# Patient Record
Sex: MALE | Race: WHITE
[De-identification: names, ages, dates, MRNs, and addresses within clinical notes are randomized per-mention and may not be internally consistent; named-entity substitution may affect disease eponyms.]

---

## 2017-10-11 ENCOUNTER — HOSPITAL ENCOUNTER (EMERGENCY)
Dept: HOSPITAL 62 - ER | Age: 22
Discharge: HOME | End: 2017-10-11
Payer: MEDICAID

## 2017-10-11 VITALS — DIASTOLIC BLOOD PRESSURE: 71 MMHG | SYSTOLIC BLOOD PRESSURE: 133 MMHG
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
## 2017-10-11 DIAGNOSIS — M25.531: ICD-10-CM

## 2017-10-11 DIAGNOSIS — S63.501A: Primary | ICD-10-CM

## 2017-10-11 DIAGNOSIS — F17.210: ICD-10-CM

## 2017-10-11 PROCEDURE — L3908 WHO COCK-UP NONMOLDE PRE OTS: HCPCS

## 2017-10-11 PROCEDURE — 73110 X-RAY EXAM OF WRIST: CPT

## 2017-10-11 PROCEDURE — 99283 EMERGENCY DEPT VISIT LOW MDM: CPT

## 2017-10-11 NOTE — RADIOLOGY REPORT (SQ)
EXAM DESCRIPTION:  WRIST RIGHT 3 VIEWS



COMPLETED DATE/TIME:  10/11/2017 9:15 am



REASON FOR STUDY:  pain, swelling at ulnar styloid



COMPARISON:  None.



NUMBER OF VIEWS:  Three views.



TECHNIQUE:  AP, lateral, and oblique radiographic images acquired of the right wrist.



LIMITATIONS:  None.



FINDINGS:  MINERALIZATION: Normal.

BONES: No acute fracture or dislocation.  No worrisome bone lesions.  Normal alignment.

SOFT TISSUES: There is mild right wrist soft tissue swelling.  No radiopaque foreign body.  No soft t
issue gas.

OTHER: No other significant finding.



IMPRESSION:  Soft tissue swelling without fracture.



TECHNICAL DOCUMENTATION:  JOB ID:  [id number]

 2011 [employer]- All Rights Reserved

## 2017-10-11 NOTE — ER DOCUMENT REPORT
ED Hand/Wrist Injury





- General


Mode of Arrival: Ambulatory


Information source: Patient


TRAVEL OUTSIDE OF THE [place] IN LAST 30 DAYS: No





<JONA KWON - Last Filed: 10/11/17 11:14>





<PATRICIA TAYLOR - Last Filed: 10/11/17 11:38>





- General


Chief Complaint: Wrist Injury


Stated Complaint: WRIST PAIN


Time Seen by Provider: 10/11/17 08:11


Notes: 





Patient is a 22-year-old male who presents to the emergency department today 

with complaints of right wrist pain.  Patient states that approximately 1 week 

ago he was punching a punching bag video game and he "heard pop".  Patient 

states later he was playing air hockey and he felt like his wrist was "popping 

in and out".  Patient states he has minimal pain now but he "wants to make sure 

his wrist is okay so that he can play softball". (JONA KWON)





Past Medical History





- General


Information source: Patient





- Social History


Smoking Status: Current Every Day Smoker


Cigarette use (# per day): Yes


Chew tobacco use (# tins/day): No


Frequency of alcohol use: None


Drug Abuse: None


Lives with: Family


Family History: Reviewed & Not Pertinent


Neurological Medical History: Reports: Hx Migraine


Surgical Hx: Negative





- Immunizations


Immunizations up to date: Yes


Hx Diphtheria, Pertussis, Tetanus Vaccination: Yes





<JONA KWON - Last Filed: 10/11/17 11:14>





Review of Systems





- Review of Systems


Constitutional: No symptoms reported


EENT: No symptoms reported


Cardiovascular: No symptoms reported


Respiratory: No symptoms reported


Gastrointestinal: No symptoms reported


Genitourinary: No symptoms reported


Male Genitourinary: No symptoms reported


Musculoskeletal: See HPI, Joint pain - right wrist


Skin: No symptoms reported


Hematologic/Lymphatic: No symptoms reported


Neurological/Psychological: No symptoms reported


-: Yes All other systems reviewed and negative





<JONA KWON - Last Filed: 10/11/17 11:14>





Physical Exam





<JONA KWON - Last Filed: 10/11/17 11:14>





<PATRICIA TAYLOR - Last Filed: 10/11/17 11:38>





- Vital signs


Vitals: 


 











Temp Pulse Resp BP Pulse Ox


 


 97.8 F   58 L  16   133/71 H  100 


 


 10/11/17 07:56  10/11/17 07:56  10/11/17 07:56  10/11/17 07:56  10/11/17 07:56














- Notes


Notes: 





Physical Exam:


 


General: Alert, appears well. 


 


HEENT: Normocephalic. Atraumatic. PERRLA. Extraocular movements intact. 

Oropharynx clear.


 


Neck: Supple. 


 


Respiratory: No respiratory distress. 


 


Abdominal: Normal Inspection. No distension. 


 


Extremities: Edema over the right ulnar styloid, minimal pain with palpation. 

No pain with ROM. 


 


Neurological: Normal cognition. AAOx4. Normal speech.  


 


Psychological: Normal affect. Normal Mood. 


 


Skin: Warm. Dry. Normal color. (JONA KWON)





Course





<JONA KWON - Last Filed: 10/11/17 11:14>





- Diagnostic Test


Radiology reviewed: Image reviewed, Reports reviewed - X-ray shows soft tissue 

swelling around the ulnar styloid without fracture





<PATRICIA TAYLOR - Last Filed: 10/11/17 11:38>





- Re-evaluation


Re-evalutation: 





10/11/17 11:37


The Velcro cock-up splint was placed on the right wrist by the PCT.  It fits 

well and provides good support and stability and protection of the wrist where 

it is sprained at the ulnar styloid. (PATRICIA TAYLOR)





- Vital Signs


Vital signs: 


 











Temp Pulse Resp BP Pulse Ox


 


 97.8 F   58 L  16   133/71 H  100 


 


 10/11/17 07:56  10/11/17 07:56  10/11/17 07:56  10/11/17 07:56  10/11/17 07:56














Discharge





<JONA KWON - Last Filed: 10/11/17 11:14>





<PATRICIA TAYLOR - Last Filed: 10/11/17 11:38>





- Discharge


Clinical Impression: 


Right wrist sprain


Qualifiers:


 Encounter type: initial encounter Qualified Code(s): S63.501A - Unspecified 

sprain of right wrist, initial encounter





Condition: Stable


Disposition: HOME, SELF-CARE


Additional Instructions: 


Your x-ray does not show a fracture.  There is soft tissue swelling around the 

ulnar styloid consistent with a sprain to this area.


The treatment of a sprain this many days out, is splinting the wrist to limit 

motion, elevation, and moist heat.


You should follow-up with an orthopedic doctor to ensure proper healing of this 

injury.


Referrals: 


[facility] [Provider Group] - Follow up in 1 week


Scribe Attestation: 





10/11/17 09:38


I personally performed the services described in the documentation, reviewed 

and edited the documentation which was dictated to the scribe in my presence, 

and it accurately records my words and actions. (PATRICIA TAYLOR)





Scribe Documentation





- Scribe


Written by Rickey:: Rickey العلي, 10/11/2017 1116


acting as scribe for :: Jw





<JONA KWON - Last Filed: 10/11/17 11:14>

## 2018-10-22 ENCOUNTER — HOSPITAL ENCOUNTER (EMERGENCY)
Dept: HOSPITAL 62 - ER | Age: 23
Discharge: HOME | End: 2018-10-22
Payer: SELF-PAY

## 2018-10-22 VITALS — DIASTOLIC BLOOD PRESSURE: 81 MMHG | SYSTOLIC BLOOD PRESSURE: 135 MMHG

## 2018-10-22 DIAGNOSIS — S89.92XA: Primary | ICD-10-CM
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
## 2018-10-22 DIAGNOSIS — F17.210: ICD-10-CM

## 2018-10-22 PROCEDURE — 99283 EMERGENCY DEPT VISIT LOW MDM: CPT

## 2018-10-22 PROCEDURE — 73564 X-RAY EXAM KNEE 4 OR MORE: CPT

## 2018-10-22 PROCEDURE — L1830 KO IMMOB CANVAS LONG PRE OTS: HCPCS

## 2018-10-22 NOTE — ER DOCUMENT REPORT
HPI





- HPI


Patient complains to provider of: Knee pain


Pain Level: 4


Context: 





Patient is a 23-year-old male presenting to the emergency department 

complaining of left knee pain.  Patient states he jumped off of a one-story 

roof and felt his left knee buckle.  Patient denies hitting his head neck or 

back.  Denies LOC or vomiting.  Patient states he has a history of a torn ACL 

and MCL these injuries were years ago.





Past medical history: None


Medications: None


Allergies: None





Patient admits to everyday cigarette smoking, denies illicit drug use, denies 

EtOH use.





- REPRODUCTIVE


Reproductive: DENIES: Pregnant:





- MUSCULOSKELETAL


Musculoskeletal: REPORTS: Extremity pain - L knee





Past Medical History





- General


Information source: Patient





- Social History


Smoking Status: Current Every Day Smoker


Frequency of alcohol use: None


Drug Abuse: None


Lives with: Family


Family History: Reviewed & Not Pertinent


Patient has suicidal ideation: No


Patient has homicidal ideation: No


Neurological Medical History: Reports: Hx Migraine


Renal/ Medical History: Denies: Hx Peritoneal Dialysis





- Immunizations


Immunizations up to date: Yes


Hx Diphtheria, Pertussis, Tetanus Vaccination: Yes





Vertical Provider Document





- CONSTITUTIONAL


Agree With Documented VS: Yes


Notes: 





GENERAL: Alert, interacts well. No acute distress.


HEAD: Normocephalic, atraumatic.


EYES: Pupils equal, round, and reactive to light. Extraocular movements intact.


ENT: Oral mucosa moist, tongue midline.


NECK: Full range of motion. Supple. Trachea midline.


LUNGS: Clear to auscultation bilaterally, no wheezes, rales, or rhonchi. No 

respiratory distress.


HEART: Regular rate and rhythm. No murmur


ABDOMEN: Soft, non-tender. Non-distended. Bowel sounds present in all 4 

quadrants.


EXTREMITIES: Moves all 4 extremities spontaneously. normal radial and dorsalis 

pedis pulses bilaterally. No cyanosis.  Minor swelling noted left knee.  No 

pain left hip or left ankle.  Positive pain upon anterior drawer.  No pain 

valgus or varus movements.


BACK: no cervical, thoracic, lumbar midline tenderness. No saddle anesthesia, 

normal distal neurovascular exam. 


NEUROLOGICAL: Alert and oriented x3. Normal speech. cranial nerves II through 

XII grossly intact 


PSYCH: Normal affect, normal mood.


SKIN: Warm, dry, normal turgor. No rashes or lesions noted.








- INFECTION CONTROL


TRAVEL OUTSIDE OF THE [place] IN LAST 30 DAYS: No





Course





- Re-evaluation


Re-evalutation: 





10/22/18 23:16


Due to your physical exam you most likely entered your ACL.  Unfortunately you 

need an MRI in order to determine this fully.  I have given you a phone number 

for orthopedics for you should follow-up with.  You should also take Tylenol 

and Motrin at home for discomfort.  Please return to the emergency room for any 

other worsening symptoms.





- Vital Signs


Vital signs: 


 











Temp Pulse Resp BP Pulse Ox


 


 98.4 F   69   18   128/72 H  100 


 


 10/22/18 19:43  10/22/18 19:43  10/22/18 19:43  10/22/18 19:43  10/22/18 19:43














Discharge





- Discharge


Clinical Impression: 


Knee injury


Qualifiers:


 Encounter type: initial encounter Laterality: left Qualified Code(s): S89.92XA 

- Unspecified injury of left lower leg, initial encounter





Condition: Stable


Disposition: HOME, SELF-CARE


Instructions:  Use of Crutches ([facility]), Ice & Elevation ([facility]), Suspected Internal 

Knee Injury ([facility]), Knee Immobilizing Splint ([facility])


Additional Instructions: 


As we discussed you should follow-up with orthopedics in the next 24-48 hours.  

Likely you have an internal knee injury due to your x-rays being negative.  

Please use the knee immobilizer and crutches as needed.  Take over-the-counter 

Tylenol and Motrin for the discomfort.  Please return to the emergency room for 

any other worsening


Forms:  Return to Work


Referrals: 


JOYCE LORD MD [ACTIVE STAFF] - Follow up as needed

## 2018-10-22 NOTE — RADIOLOGY REPORT (SQ)
EXAM DESCRIPTION:  KNEE LEFT 4 VIEW



COMPLETED DATE/TIME:  10/22/2018 8:13 pm



REASON FOR STUDY:  knee injury



COMPARISON:  None.



NUMBER OF VIEWS:  Four views.



TECHNIQUE:  AP, lateral, and both oblique radiographic images acquired of the left knee.



LIMITATIONS:  None.



FINDINGS:  MINERALIZATION: Normal.

BONES: No acute fracture or dislocation.  No worrisome bone lesions.

JOINT: Minimal joint effusion.

SOFT TISSUES: No soft tissue swelling.  No radio-opaque foreign body.

OTHER: No other significant finding.



IMPRESSION:  Minimal joint effusion.  No osseous abnormality.



TECHNICAL DOCUMENTATION:  JOB ID:  [id number]

 2011 [employer]- All Rights Reserved



Reading location - IP/workstation name: [email or web address]